# Patient Record
Sex: MALE | Race: WHITE | NOT HISPANIC OR LATINO | Employment: UNEMPLOYED | URBAN - METROPOLITAN AREA
[De-identification: names, ages, dates, MRNs, and addresses within clinical notes are randomized per-mention and may not be internally consistent; named-entity substitution may affect disease eponyms.]

---
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
## 2020-08-31 ENCOUNTER — OFFICE VISIT (OUTPATIENT)
Dept: ORTHOPEDIC SURGERY | Facility: CLINIC | Age: 20
End: 2020-08-31

## 2020-08-31 VITALS
DIASTOLIC BLOOD PRESSURE: 76 MMHG | SYSTOLIC BLOOD PRESSURE: 129 MMHG | BODY MASS INDEX: 20.32 KG/M2 | HEIGHT: 72 IN | WEIGHT: 150 LBS | HEART RATE: 76 BPM

## 2020-08-31 DIAGNOSIS — M75.21 BICEPS TENDINITIS, RIGHT: ICD-10-CM

## 2020-08-31 DIAGNOSIS — M25.511 ACUTE PAIN OF RIGHT SHOULDER: Primary | ICD-10-CM

## 2020-08-31 PROCEDURE — 99203 OFFICE O/P NEW LOW 30 MIN: CPT | Performed by: NURSE PRACTITIONER

## 2020-08-31 NOTE — PROGRESS NOTES
Subjective:     Patient ID: Abrahan Wynn is a 20 y.o. male.    Chief Complaint:  Right shoulder pain  History of Present Illness  Abrahan Wynn presents to clinic for evaluation of his right shoulder.  Approximately 5 days ago was lifting chicken tenders at work to approximately 30 degrees with heavy bag of chicken tenders began experiencing pain at the anterior lateral aspect of the shoulder.  Rates discomfort as 7 to an 8 out of a 10 mainly aching sharp in nature constant and irritating pain.  He reports previous clavicle fracture in 7 th grade, was not ever seen by can tell based on abnormality.  To this day notices guarding of the right shoulder.  Approximately 5 years ago began experiencing pain lateral and anterolateral aspect right shoulder, increased pain with reaching out to side, reaching back which is continued to this day.. Decreased range of motion with reaching up. Recently tingling/numbness distal phalanx digits 2-5 however new. Prior football injury about 5 years ago. Seen at  for shoulder in past and most recently, recent x-ray imaging about 5 days ago completed at . Not currently taking any medications however has taken diclofenac and ibuprofen in the past.  Initially began working with his gym  at onset of injury denies that he was seen by orthopedist initial injury.  Denies any previous corticosteroid injections, surgical intervention. Increased pain at night when sleeping while laying, denies pain reaching across body, pain does radiate into lateral aspect neck, right side. Unable to workout due to pain with curling and bench press therefore unable to complete arm exercises.  He is right-hand dominant does report significantly decreased strength right upper extremity compared to that of the left.  Denies other concerns present this time.    Social History     Occupational History   • Not on file   Tobacco Use   • Smoking status: Current Some Day Smoker     Types: Electronic Cigarette   •  Smokeless tobacco: Former User   Substance and Sexual Activity   • Alcohol use: No   • Drug use: Yes     Types: Marijuana   • Sexual activity: Defer      Past Medical History:   Diagnosis Date   • Fracture, finger     5th finger   • H/O multiple concussions    • Migraine      Past Surgical History:   Procedure Laterality Date   • TONSILLECTOMY         Family History   Problem Relation Age of Onset   • Cancer Paternal Grandmother    • No Known Problems Mother    • Drug abuse Father    • No Known Problems Sister    • Anxiety disorder Brother          Review of Systems   Constitutional: Negative for chills, diaphoresis, fever and unexpected weight change.   HENT: Negative for hearing loss, nosebleeds, sore throat and tinnitus.    Eyes: Negative for pain and visual disturbance.   Respiratory: Negative for cough, shortness of breath and wheezing.    Cardiovascular: Negative for chest pain and palpitations.   Gastrointestinal: Negative for abdominal pain, diarrhea, nausea and vomiting.   Endocrine: Negative for cold intolerance, heat intolerance and polydipsia.   Genitourinary: Negative for difficulty urinating, dysuria and hematuria.   Musculoskeletal: Positive for arthralgias and myalgias. Negative for joint swelling.        Shoulder pain     Skin: Negative for rash and wound.   Allergic/Immunologic: Negative for environmental allergies.   Neurological: Negative for dizziness, syncope and numbness.   Hematological: Does not bruise/bleed easily.   Psychiatric/Behavioral: Negative for dysphoric mood and sleep disturbance. The patient is not nervous/anxious.            Objective:  Physical Exam    Vital signs reviewed.   General: No acute distress.  Eyes: conjunctiva clear; pupils equally round and reactive  ENT: external ears and nose atraumatic; oropharynx clear  CV: no peripheral edema  Resp: normal respiratory effort  Skin: no rashes or wounds; normal turgor  Psych: mood and affect appropriate; recent and remote memory  "intact    Vitals:    08/31/20 1448   BP: 129/76   Pulse: 76   Weight: 68 kg (150 lb)   Height: 182.9 cm (72\")         08/31/20  1448   Weight: 68 kg (150 lb)     Body mass index is 20.34 kg/m².      Right Shoulder Exam     Tenderness   The patient is experiencing tenderness in the acromion and biceps tendon.    Range of Motion   External rotation: 50 (Passive)   Forward flexion: 170 (Passive)     Tests   Boyle test: positive  Cross arm: negative  Impingement: positive  Drop arm: negative    Other   Erythema: absent  Sensation: normal  Pulse: present    Comments:  Internal rotation strength 4+ out of 5  External rotation strength 4+ out of 5  Supraspinatus strength 4 out of 5 strength subscapularis strength 4 out of 5  Biceps 4 out of 5  Mildly positive empty can  negative Plano's  Mildly positive Speed's  negative bear hug exam                   Imaging:   Independently reviewed 3 view x-ray imaging previously completed outside facility right shoulder negative acute fracture dislocation or other acute osseous injury no x-ray imaging available for comparison    Assessment:        1. Acute pain of right shoulder    2. Biceps tendinitis, right           Plan:  1.  Discussed plan of care with patient.  We will proceed with MRI of the right shoulder given the length of time with the acute exacerbation.  Plan to see him back in clinic after completion of testing discussed results and further plan of care.  He verbalized understanding of all information agrees with plan of care.  Denies other concerns present this time.  Orders:  Orders Placed This Encounter   Procedures   • MRI Shoulder Right Without Contrast       Medications:  No orders of the defined types were placed in this encounter.      Followup:  No follow-ups on file.    Abrahan was seen today for pain.    Diagnoses and all orders for this visit:    Acute pain of right shoulder  -     MRI Shoulder Right Without Contrast; Future    Biceps tendinitis, right  -    "  MRI Shoulder Right Without Contrast; Future          I ordered and reviewed the LA today.     Dictated utilizing Dragon dictation

## 2020-09-09 ENCOUNTER — HOSPITAL ENCOUNTER (OUTPATIENT)
Dept: MRI IMAGING | Facility: HOSPITAL | Age: 20
Discharge: HOME | End: 2020-09-09
Admitting: NURSE PRACTITIONER

## 2020-09-09 DIAGNOSIS — M25.511 ACUTE PAIN OF RIGHT SHOULDER: ICD-10-CM

## 2020-09-09 DIAGNOSIS — M75.21 BICEPS TENDINITIS, RIGHT: ICD-10-CM

## 2020-09-09 PROCEDURE — 73221 MRI JOINT UPR EXTREM W/O DYE: CPT

## 2020-09-15 ENCOUNTER — OFFICE VISIT (OUTPATIENT)
Dept: ORTHOPEDIC SURGERY | Facility: CLINIC | Age: 20
End: 2020-09-15

## 2020-09-15 VITALS — WEIGHT: 150 LBS | HEIGHT: 72 IN | BODY MASS INDEX: 20.32 KG/M2

## 2020-09-15 DIAGNOSIS — M75.21 BICEPS TENDINITIS, RIGHT: Primary | ICD-10-CM

## 2020-09-15 DIAGNOSIS — S43.421D SPRAIN OF RIGHT ROTATOR CUFF CAPSULE, SUBSEQUENT ENCOUNTER: ICD-10-CM

## 2020-09-15 PROCEDURE — 99214 OFFICE O/P EST MOD 30 MIN: CPT | Performed by: NURSE PRACTITIONER

## 2020-09-15 RX ORDER — DICLOFENAC SODIUM 75 MG/1
75 TABLET, DELAYED RELEASE ORAL 2 TIMES DAILY
Qty: 60 TABLET | Refills: 0 | Status: SHIPPED | OUTPATIENT
Start: 2020-09-15 | End: 2021-08-03

## 2020-09-15 NOTE — PROGRESS NOTES
Subjective:     Patient ID: Abrahan Wynn is a 20 y.o. male.    Chief Complaint:  Follow-up right shoulder pain  History of Present Illness  Abrahan Wynn returns to clinic for follow-up right shoulder.  Has completed MRI presents to discuss results.  Initially presented to clinic on 8/31/2020 with a reported 5-day history of pain at the right shoulder after he was lifting chicken tenders at work.  He was lifting approximately 30 pound bag of heavy chicken tenders and when he began experiencing pain in the anterior lateral aspect of the shoulder.   Rates discomfort as 7 to an 8 out of a 10 mainly aching sharp in nature constant and irritating pain.  He reports previous clavicle fracture in 7 th grade, was not ever seen by can tell based on abnormality.  To this day notices guarding of the right shoulder.  Approximately 5 years ago began experiencing pain lateral and anterolateral aspect right shoulder, increased pain with reaching out to side, reaching back which is continued to this day.. Decreased range of motion with reaching up. Recently tingling/numbness distal phalanx digits 2-5 however new. Prior football injury about 5 years ago. Seen at  for shoulder in past and most recently, recent x-ray imaging about 5 days ago completed at . Not currently taking any medications however has taken diclofenac and ibuprofen in the past.  Initially began working with his gym  at onset of injury denies that he was seen by orthopedist initial injury.  Denies any previous corticosteroid injections, surgical intervention. Increased pain at night when sleeping while laying, denies pain reaching across body, pain does radiate into lateral aspect neck, right side. Unable to workout due to pain with curling and bench press therefore unable to complete arm exercises.  He is right-hand dominant does report significantly decreased strength right upper extremity compared to that of the left.  Denies other concerns present this  time.     Social History     Occupational History   • Not on file   Tobacco Use   • Smoking status: Current Some Day Smoker     Types: Electronic Cigarette   • Smokeless tobacco: Former User   Substance and Sexual Activity   • Alcohol use: No   • Drug use: Yes     Types: Marijuana   • Sexual activity: Defer      Past Medical History:   Diagnosis Date   • Fracture, finger     5th finger   • H/O multiple concussions    • Migraine      Past Surgical History:   Procedure Laterality Date   • TONSILLECTOMY         Family History   Problem Relation Age of Onset   • Cancer Paternal Grandmother    • No Known Problems Mother    • Drug abuse Father    • No Known Problems Sister    • Anxiety disorder Brother          Review of Systems   Constitutional: Negative for chills, diaphoresis, fever and unexpected weight change.   HENT: Negative for hearing loss, nosebleeds, sore throat and tinnitus.    Eyes: Negative for pain and visual disturbance.   Respiratory: Negative for cough, shortness of breath and wheezing.    Cardiovascular: Negative for chest pain and palpitations.   Gastrointestinal: Negative for abdominal pain, diarrhea, nausea and vomiting.   Endocrine: Negative for cold intolerance, heat intolerance and polydipsia.   Genitourinary: Negative for difficulty urinating, dysuria and hematuria.   Musculoskeletal: Positive for arthralgias. Negative for joint swelling and myalgias.   Skin: Negative for rash and wound.   Allergic/Immunologic: Negative for environmental allergies.   Neurological: Negative for dizziness, syncope and numbness.   Hematological: Does not bruise/bleed easily.   Psychiatric/Behavioral: Negative for dysphoric mood and sleep disturbance. The patient is not nervous/anxious.            Objective:  Physical Exam    General: No acute distress.  Eyes: conjunctiva clear; pupils equally round and reactive  ENT: external ears and nose atraumatic; oropharynx clear  CV: no peripheral edema  Resp: normal  "respiratory effort  Skin: no rashes or wounds; normal turgor  Psych: mood and affect appropriate; recent and remote memory intact    Vitals:    09/15/20 1311   Weight: 68 kg (150 lb)   Height: 182.9 cm (72\")         09/15/20  1311   Weight: 68 kg (150 lb)     Body mass index is 20.34 kg/m².      Ortho Exam      Right Shoulder Exam      Tenderness   The patient is experiencing tenderness in the acromion and biceps tendon.     Range of Motion   External rotation: 50 (Passive)   Forward flexion: 170 (Passive)      Tests   Boyle test: positive  Cross arm: negative  Impingement: positive  Drop arm: negative     Other   Erythema: absent  Sensation: normal  Pulse: present     Comments:  Internal rotation strength 4+ out of 5  External rotation strength 4+ out of 5  Supraspinatus strength 4 out of 5 strength subscapularis strength 4 out of 5  Biceps 4 out of 5  Mildly positive empty can  negative Thayer's  Mildly positive Speed's  negative bear hug exam    Imaging:  Xr Shoulder 2+ View Right    Result Date: 8/26/2020  Negative right shoulder. Signer Name: Graham Stewart MD  Signed: 8/26/2020 4:37 PM  Workstation Name: [id number]  [facility]    Mri Shoulder Right Without Contrast    Result Date: 9/10/2020  1. No evidence of rotator cuff tear. 2. Mild peritendinous muscle edema in the teres minor muscle which may reflect a mild sprain. 3. Region of signal abnormality in the subcortical bone of the lateral humeral head adjacent to the attachment of the rotator cuff which may reflect a small focus of marrow edema or developing subcortical cyst. 4. The glenoid labrum appears grossly intact. Signer Name: Judith Gee MD  Signed: 9/10/2020 11:26 AM  Workstation Name: [id number]  [facility]    Assessment:        1. Biceps tendinitis, right    2. Sprain of right rotator cuff capsule, subsequent encounter           Plan:  1.  Discussed plan of care with patient.  We will " proceed with diclofenac oral 1 tablet twice daily with food.  Also proceed with physical therapy for range of motion strengthening.  I do recommend follow-up in office in 4 weeks to reevaluate.  He verbalized understanding of information agrees with plan of care.  Denies other concerns present time.  Orders:  Orders Placed This Encounter   Procedures   • Ambulatory Referral to Physical Therapy       Medications:  New Medications Ordered This Visit   Medications   • diclofenac (VOLTAREN) 75 MG EC tablet     Sig: Take 1 tablet by mouth 2 (Two) Times a Day.     Dispense:  60 tablet     Refill:  0       Followup:  No follow-ups on file.    Abrahan was seen today for follow-up.    Diagnoses and all orders for this visit:    Biceps tendinitis, right  -     Ambulatory Referral to Physical Therapy    Sprain of right rotator cuff capsule, subsequent encounter  -     Ambulatory Referral to Physical Therapy    Other orders  -     diclofenac (VOLTAREN) 75 MG EC tablet; Take 1 tablet by mouth 2 (Two) Times a Day.        Dictated utilizing Dragon dictation

## 2020-09-16 PROBLEM — S43.421A SPRAIN OF RIGHT ROTATOR CUFF CAPSULE: Status: ACTIVE | Noted: 2020-09-16

## 2020-09-24 ENCOUNTER — HOSPITAL ENCOUNTER (OUTPATIENT)
Dept: PHYSICAL THERAPY | Facility: HOSPITAL | Age: 20
Setting detail: THERAPIES SERIES
Discharge: HOME | End: 2020-09-24

## 2020-09-24 DIAGNOSIS — S43.421A SPRAIN OF RIGHT ROTATOR CUFF CAPSULE, INITIAL ENCOUNTER: Primary | ICD-10-CM

## 2020-09-24 DIAGNOSIS — M75.21 BICEPS TENDINITIS ON RIGHT: ICD-10-CM

## 2020-09-24 PROCEDURE — 97161 PT EVAL LOW COMPLEX 20 MIN: CPT

## 2020-09-24 NOTE — THERAPY EVALUATION
Outpatient Physical Therapy Ortho Initial Evaluation  [place]     Patient Name: Abrahan Wynn  : 2000  MRN: [id number]  Today's Date: 2020      Visit Date: 2020    Patient Active Problem List   Diagnosis   • Sprain of right ankle   • Sports physical   • Sprain of right rotator cuff capsule        Past Medical History:   Diagnosis Date   • Fracture, finger     5th finger   • H/O multiple concussions    • Migraine         Past Surgical History:   Procedure Laterality Date   • TONSILLECTOMY         Visit Dx:     ICD-10-CM ICD-9-CM   1. Sprain of right rotator cuff capsule, initial encounter  S43.421A 840.4   2. Biceps tendinitis on right  M75.21 726.12         Patient History     Row Name 20 1500             History    Chief Complaint  Difficulty with daily activities;Joint stiffness;Muscle tenderness;Muscle weakness;Pain;Numbness;Tinglings;Tightness  -LN      Type of Pain  Shoulder pain Right  -LN      Date Current Problem(s) Began  -- 7th grade; increased 1 month ago  -LN      Brief Description of Current Complaint  Patient broke his clavicle in 7th grade playing football and he never went to doctor and he has had pain in right shoulder ever since.  Patient reports he was at work about a month ago and he was lifting a bag of frozen chicken tenders and he had a sharp pain in his shoulder and decided he needed to get his shoulder checked out- bag weighed 3#.  Increased pain since then. He reports a lot of popping in shoulder.  Reports N/T in fingertips with holding arm down extended time. occas. radiating pain into elbow (sharp).  MRI showed a RC sprain (teres minor).   -LN      Patient/Caregiver Goals  Relieve pain;Improve mobility;Improve strength;Know what to do to help the symptoms  -LN      Hand Dominance  right-handed  -LN      Occupation/sports/leisure activities  works at [employer] (); likes to play basketball; likes music  -LN      Patient seeing anyone else  "for problem(s)?  Ortho  -LN      How has patient tried to help current problem?  ice; anti-inflammatory  -LN      What clinical tests have you had for this problem?  X-ray;MRI  -LN      Results of Clinical Tests  No evidence of rotator cuff tear.    Mild peritendinous muscle edema in the teres minor muscle which may reflect a mild sprain. Region of signal abnormality in the subcortical bone of the lateral humeral head adjacent to the attachment of the rotator cuff which may reflect a small focus of marrow edema or developing subcortical cyst 4. The glenoid labrum appears grossly intact  -LN      Related/Recent Hospitalizations  No  -LN      Surgery/Hospitalization  n/a  -LN      History of Previous Related Injuries  broken clavicle in 7th grade- football injury.  -LN         Pain     Pain Location  Shoulder \"Inside center of shoulder\"; right  -LN      Pain at Present  6  -LN      Pain at Best  6  -LN      Pain at Worst  10  -LN      Pain Frequency  Constant/continuous  -LN      Pain Description  Tiring;Sharp irritation; occas sharp  -LN      What Performance Factors Make the Current Problem(s) WORSE?  going back with arm; overhead movement; lifting with right arm  -LN      What Performance Factors Make the Current Problem(s) BETTER?  rest  -LN      Tolerance Time- Lying  can't tolerated lying on right side secondary to pain  -LN      Is your sleep disturbed?  Yes  -LN      What position do you sleep in?  Supine;Left sidelying  -LN      Difficulties at work?  can do everything, but has made adjustments and uses left arm more  -LN      Difficulties with ADL's?  pain with all ADLs using right arm; pain with donning and doffing shirt.  -LN      Difficulties with recreational activities?  Basketball- can't play right now  -LN         Fall Risk Assessment    Any falls in the past year:  Yes  -LN      Number of falls reported in the last 12 months  1  -LN      Factors that contributed to the fall:  Tripped at work  -LN   " "      Services    Prior Rehab/Home Health Experiences  No  -LN      Are you currently receiving Home Health services  No  -LN      Do you plan to receive Home Health services in the near future  No  -LN         Daily Activities    Primary Language  English  -LN      How does patient learn best?  Listening;Demonstration  -LN      Teaching needs identified  Home Exercise Program;Other (comment) Risks and benefits of treatment explained to patient.  -LN      Patient is concerned about/has problems with  Bed Mobility;Difficulty with self care (i.e. bathing, dressing, toileting:;Flexibility;Grasping objects lifting;Performing home management (household chores, shopping, care of dependents);Performing job responsibilities/community activities (work, school,;Performing sports, recreation, and play activities;Reaching over head;Repetitive movements of the hand, arm, shoulder  -LN      Does patient have problems with the following?  Depression;Anxiety;Other (comment);Panic Attack emotional problems   -LN      Barriers to learning  None  -LN      Pt Participated in POC and Goals  Yes  -LN         Safety    Are you being hurt, hit, or frightened by anyone at home or in your life?  No  -LN      Are you being neglected by a caregiver  No  -LN        User Key  (r) = Recorded By, (t) = Taken By, (c) = Cosigned By    Initials Name Provider Type    Darshana Champion, PT Physical Therapist          PT Ortho     Row Name 09/24/20 1500       Subjective Comments    Subjective Comments  Patient reports his shoulder has given him problems for a long time but worse x 1 month.  \"I can't lift my arm very high.\" \"I didn't think it was weak but the doctor said it is a lot weaker than my left arm when she tested it.\"  Patient reports that when he lies on his back, his right shoulder won't go flat and if he pushes it down, it really hurts a lot.   -LN       Precautions and Contraindications    Precautions/Limitations  no known " precautions/limitations  -LN       Subjective Pain    Able to rate subjective pain?  yes  -LN    Pre-Treatment Pain Level  6  -LN       Posture/Observations    Forward Head  Moderate  -LN    Rounded Shoulders  Right:;Moderate;Left:;Mild  -LN    Posture/Observations Comments  Patient tends to keep right shoulder very guarded; Bilateral scapula winging noted; R>L (scapula dyskinesis)   -LN       Shoulder Impingement/Rotator Cuff Special Tests    Full Can Test (RC Lesion)  Right:;Positive  -LN    Empty Can Test (RC Lesion)  Right:;Positive  -LN    Drop Arm Test (Full Thickness RC Lesion)  Right:;Negative  -LN    Speed's Test (LH of Biceps Lesion)  Right:;Positive  -LN       Shoulder Girdle Palpation    Supraspinatus Insertion  Right:;Tender  -LN    AC Joint  Right:;Tender  -LN    Teres Minor  Right:;Tender;Guarded/taut  -LN    Pect Minor  Right:;Tender;Guarded/taut  -LN    Upper Trap  Right:;Guarded/taut  -LN       General ROM    GENERAL ROM COMMENTS  Right elbow flexion WFL; extension 8 degrees- he has limited extension B elbows.   -LN       Right Upper Ext    Rt Shoulder Abduction AROM  100 degrees  -LN    Rt Shoulder Abduction PROM  127 degrees  -LN    Rt Shoulder Flexion AROM  126 degrees  -LN    Rt Shoulder Flexion PROM  135 degrees  -LN    Rt Shoulder External Rotation AROM  56 degrees  -LN    Rt Shoulder External Rotation PROM  60 degrees  -LN    Rt Shoulder Internal Rotation AROM  behind back- L4 level; 80 degrees  -LN    Rt Shoulder Internal Rotation PROM  90 degrees  -LN       MMT (Manual Muscle Testing)    General MMT Comments  Left shoulder 5/5.  -LN       MMT Right Upper Ext    Rt Shoulder Flexion MMT, Gross Movement  (4-/5) good minus  -LN    Rt Shoulder Extension MMT, Gross Movement  (4+/5) good plus  -LN    Rt Shoulder ABduction MMT, Gross Movement  (3+/5) fair plus  -LN    Rt Shoulder ADduction MMT, Gross Movement  (4+/5) good plus  -LN    Rt Shoulder Internal Rotation MMT, Gross Movement  (4/5) good   -LN    Rt Shoulder External Rotation MMT, Gross Movement  (4/5) good  -LN    Rt Scapular ADduction MMT, Gross Movement  (3+/5) fair plus  -LN    Rt Elbow Flexion MMT, Gross Movement:  (4/5) good  -LN    Rt Elbow Extension MMT, Gross Movement:  (4/5) good  -LN    Rt Upper Extremity Comments   Decreased scapula stability noted and right scapula tends to wing significantly with shoulder ROM fahad when he brings his arm down from forward flexion.  -LN       Sensation    Sensation WNL?  WNL  -LN    Light Touch  No apparent deficits  -LN    Additional Comments  does c/o N/T in fingertips occas. fahad if he keeps his arm down too long.  -LN       Upper Extremity Flexibility    Upper Trapezius  Right:;Moderately limited  -LN    Pect Minor  Right:;Moderately/severely limited  -LN    Pect Major  Right:;Moderately/severely limited  -LN       Gait/Stairs (Locomotion)    Comment (Gait/Stairs)  Patient independent with all mobility and gait.  -LN      User Key  (r) = Recorded By, (t) = Taken By, (c) = Cosigned By    Initials Name Provider Type    LN Darshana Cuellar, PT Physical Therapist                      Therapy Education  Education Details: Patient to work on HEP 2 x day and use MH/CP PRN.  Posture education.  Given: HEP, Symptoms/condition management, Pain management, Posture/body mechanics  Program: New  How Provided: Verbal, Demonstration, Written  Level of Understanding: Teach back education performed, Verbalized, Demonstrated     PT OP Goals     Row Name 09/24/20 1800          PT Short Term Goals    STG Date to Achieve  10/08/20  -LN     STG 1  Patient to verbally report decreased right shoulder pain to <5/10 with ADLs and everyday activities.  -LN     STG 2  Patient to have improved right shoulder AROM to 140 degrees flexion, 120 degrees scaption/abduction, IR 90 degrees, and ER 70 degrees to allow for improved functional use of  right UE with ADLs.   -LN     STG 3  Patient to have improved right shoulder & elbow  strength by 1/2 muscle grade.  -LN     STG 4  Patient able to tolerate lying on right shoulder to sleep for short periods.   -LN        Long Term Goals    LTG Date to Achieve  10/22/20  -LN     LTG 1  Patient to verbally report decreased right shoulder pain to <3/10 with ADLs and everyday activities.  -LN     LTG 2  Patient independent with HEP issued by therapist.  -LN     LTG 3  Right shoulder AROM improved to WFL all planes to allow good functional use of right UE with ADLs.   -LN     LTG 4  Right shoulder & elbow strength improved to 4+/5-5/5.  -LN     LTG 5  Patient able to don and doff shirt with no c/o increased right shoulder pain.   -LN     LTG 6  Right scapula strength improved to 4+/5 with decreased winging noted with shoulder ROM/improved scapula stability.  -LN     LTG 7  Patient able to shoot 5-10 baskets with right shoulder pain no > than 4/10.   -LN        Time Calculation    PT Goal Re-Cert Due Date  10/22/20  -LN       User Key  (r) = Recorded By, (t) = Taken By, (c) = Cosigned By    Initials Name Provider Type    Darshana Champion, PT Physical Therapist          PT Assessment/Plan     Row Name 09/24/20 1500          PT Assessment    Functional Limitations  Limitation in home management;Limitations in community activities;Limitations in functional capacity and performance;Performance in leisure activities;Performance in self-care ADL;Performance in work activities;Performance in sport activities  -LN     Impairments  Impaired flexibility;Joint mobility;Muscle strength;Pain;Posture;Range of motion;Sensation  -LN     Assessment Comments  Patient presents with long history of right shoulder pain/issues since he fractured his right clavicle in 7th grade and recent increase in shoulder pain after lifting a bag of frozen chicken tenders 1 month ago at work.  Patient presents with constant right shoulder pain, fahad posterior in area of teres minor, decreased right shoulder ROM, decreased right  shoulder/RC and elbow strength, decreased right scapula strength and stability noted; significant right pec tightness/forward shoulder position; decreased functional use of right UE with ADLs and everyday work and home activities; decreased tolerance to leisure activities, including basketball. Patient with signs of right RC/teres minor strain with right scapula dyskinesis noted.   -LN     Please refer to paper survey for additional self-reported information  Yes  -LN     Rehab Potential  Good  -LN     Patient/caregiver participated in establishment of treatment plan and goals  Yes  -LN     Patient would benefit from skilled therapy intervention  Yes  -LN        PT Plan    PT Frequency  1x/week;2x/week  -LN     Predicted Duration of Therapy Intervention (OT)  6 weeks  -LN     Planned CPT's?  PT EVAL LOW COMPLEXITY: 69680;PT THER PROC EA 15 MIN: 83343;PT MANUAL THERAPY EA 15 MIN: 17783;PT HOT OR COLD PACK TREAT MCARE;PT ELECTRICAL STIM UNATTEND: ;PT ULTRASOUND EA 15 MIN: 65650;PT IONTOPHORESIS EA 15 MIN: 03065  -LN     Physical Therapy Interventions (Optional Details)  home exercise program;joint mobilization;manual therapy techniques;modalities;patient/family education;postural re-education;ROM (Range of Motion);strengthening;stretching;taping  -LN     PT Plan Comments  See patient 1-2 x week for therapeutic exercise/ROM/RC strengthening/scapula strengtheningand stabilization/pec stretching with HEP, modalities PRN (IFC/MH/CP/US/ionto.); Kinesiotape PRN. Patient and posture education.  Consider EMS for scapula strengthening/mm re-ed.   -LN       User Key  (r) = Recorded By, (t) = Taken By, (c) = Cosigned By    Initials Name Provider Type    Darshana Champion, PT Physical Therapist          Modalities     Row Name 09/24/20 1500             Ice    Ice Applied  Yes  -LN      Location  right shoulder with IFC  -LN      Rx Minutes  15 mins  -LN      Ice S/P Rx  Yes  -LN      Patient reports will apply ice  "at home to involved area  Yes  -LN         ELECTRICAL STIMULATION    Attended/Unattended  Unattended  -LN      Stimulation Type  IFC  -LN      Location/Electrode Placement/Other  Right shoulder with CP.  -LN       PT E-Stim Unattended (Manual) Minutes  15  -LN        User Key  (r) = Recorded By, (t) = Taken By, (c) = Cosigned By    Initials Name Provider Type    Darshana Champion, PT Physical Therapist        OP Exercises     Row Name 09/24/20 1500             Precautions    Existing Precautions/Restrictions  no known precautions/restrictions  -LN         Subjective Comments    Subjective Comments  Patient reports his shoulder has given him problems for a long time but worse x 1 month.  \"I can't lift my arm very high.\" \"I didn't think it was weak but the doctor said it is a lot weaker than my left arm when she tested it.\"  Patient reports that when he lies on his back, his right shoulder won't go flat and if he pushes it down, it really hurts a lot.   -LN         Subjective Pain    Able to rate subjective pain?  yes  -LN      Pre-Treatment Pain Level  6  -LN      Post-Treatment Pain Level  6  -LN         Exercise 1    Exercise Name 1  supine cane AA shoulder flexion  -LN      Cueing 1  Verbal;Tactile;Demo  -LN      Reps 1  10  -LN      Time 1  5 sec  -LN         Exercise 2    Exercise Name 2  supine cane AA shoulder ER  -LN      Cueing 2  Verbal;Tactile;Demo  -LN      Reps 2  10   -LN      Time 2  5 sec  -LN         Exercise 3    Exercise Name 3  codman's pendulum cw and ccw  -LN      Cueing 3  Verbal;Tactile;Demo  -LN      Reps 3  20 each  -LN         Exercise 4    Exercise Name 4  wall walk flexion  -LN      Cueing 4  Verbal;Tactile;Demo  -LN      Reps 4  5  -LN      Time 4  10 sec  -LN         Exercise 5    Exercise Name 5  scapula retraction  -LN      Cueing 5  Verbal;Tactile;Demo  -LN      Reps 5  5  -LN      Time 5  10 sec  -LN      Additional Comments  cueing for proper technique  -LN      "   User Key  (r) = Recorded By, (t) = Taken By, (c) = Cosigned By    Initials Name Provider Type    Darshana Champion, PT Physical Therapist           Manual Rx (last 36 hours)      Manual Treatments     Row Name 09/24/20 1500             Manual Rx 1    Manual Rx 1 Location  Right shoulder  -LN      Manual Rx 1 Type  PROM all planes x 5-8 reps each with patient supine.   -LN      Manual Rx 1 Grade  to tolerance  -LN        User Key  (r) = Recorded By, (t) = Taken By, (c) = Cosigned By    Initials Name Provider Type    Darshana Champion, PT Physical Therapist                      Outcome Measure Options: Quick DASH  Quick DASH  Open a tight or new jar.: Mild Difficulty  Do heavy household chores (e.g., wash walls, wash floors): Moderate Difficulty  Carry a shopping bag or briefcase: Moderate Difficulty  Wash your back: Severe Difficulty  Use a knife to cut food: No Difficulty  Recreational activities in which you take some force or impact through your arm, should or hand (e.g. golf, hammering, tennis, etc.): Severe Difficulty  During the past week, to what extent has your arm, shoulder, or hand problem interfered with your normal social activites with family, friends, neighbors or groups?: Quite a bit  During the past week, were you limited in your work or other regular daily activities as a result of your arm, shoulder or hand problem?: Moderately Limited  Arm, Shoulder, or hand pain: Moderate  Tingling (pins and needles) in your arm, shoulder, or hand: Moderate  During the past week, how much difficulty have you had sleeping because of the pain in your arm, shoulder or hand?: Severe Difficulty  Number of Questions Answered: 11  Quick DASH Score: 52.27  Work Module (Optional)  Using your usual technique for your work?: Unable( at [employer])  Doing your usual work because of arm, shoulder or hand pain?: Moderate Difficulty  Doing your work as well as you would like?: No  Difficulty  Spending your usual amount of time doing your work?: No Difficulty  Work Module Score: 37.5  Sports/Performing Arts Module (Optional)  Using your usual technique for playing your instrument or sport?: Unable(Basketball)  Playing your musical instrument or sport because of arm, shoulder or hand pain?: Unable  Playing your musical instrument or sport as well as you would like?: Unable  Spending your usual amount of time practising or playing your instrument or sport?: Unable  Sports/Performing Arts Score: 100         Time Calculation:     Start Time: 1505  Stop Time: 1610  Time Calculation (min): 65 min     Therapy Charges for Today     Code Description Service Date Service Provider Modifiers Qty    08087461196 HC PT EVAL LOW COMPLEXITY 4 9/24/2020 Darshana Cuellar, PT GP 1          PT G-Codes  Outcome Measure Options: Quick DASH  Quick DASH Score: 52.27         Darshana Cuellar, PT  9/24/2020

## 2020-09-29 ENCOUNTER — DOCUMENTATION (OUTPATIENT)
Dept: PHYSICAL THERAPY | Facility: HOSPITAL | Age: 20
End: 2020-09-29

## 2020-09-29 ENCOUNTER — APPOINTMENT (OUTPATIENT)
Dept: PHYSICAL THERAPY | Facility: HOSPITAL | Age: 20
End: 2020-09-29

## 2020-10-07 ENCOUNTER — HOSPITAL ENCOUNTER (OUTPATIENT)
Dept: PHYSICAL THERAPY | Facility: HOSPITAL | Age: 20
Setting detail: THERAPIES SERIES
Discharge: HOME | End: 2020-10-07

## 2020-10-07 DIAGNOSIS — S43.421A SPRAIN OF RIGHT ROTATOR CUFF CAPSULE, INITIAL ENCOUNTER: Primary | ICD-10-CM

## 2020-10-07 PROCEDURE — 97110 THERAPEUTIC EXERCISES: CPT

## 2020-10-07 NOTE — THERAPY TREATMENT NOTE
Outpatient Physical Therapy Ortho Treatment Note  [facility]     Patient Name: Abrahan Wynn  : 2000  MRN: [id number]  Today's Date: 10/7/2020      Visit Date: 10/07/2020    Visit Dx:    ICD-10-CM ICD-9-CM   1. Sprain of right rotator cuff capsule, initial encounter  S43.421A 840.4       Patient Active Problem List   Diagnosis   • Sprain of right ankle   • Sports physical   • Sprain of right rotator cuff capsule        Past Medical History:   Diagnosis Date   • Fracture, finger     5th finger   • H/O multiple concussions    • Migraine         Past Surgical History:   Procedure Laterality Date   • TONSILLECTOMY                         PT Assessment/Plan     Row Name 10/07/20 1428          PT Assessment    Assessment Comments  Pt tolerated progression of scapular strengthening well; reports of soreness at completion of session. Weakness noted primarily with prone exercises. Trial of KT tape applied for scapular stabilization.   -KM        PT Plan    PT Plan Comments  Plan to see pt 1x next week. Pt to continue with HEP.   -KM       User Key  (r) = Recorded By, (t) = Taken By, (c) = Cosigned By    Initials Name Provider Type    Martha Lemus PTA Physical Therapy Assistant          Modalities     Row Name 10/07/20 1428             Ice    Ice Applied  --  -KM      Location  --  -KM      Rx Minutes  --  -KM      Ice S/P Rx  --  -KM      Patient denies application of Ice  Yes  -KM      Patient reports will apply ice at home to involved area  Yes  -KM         ELECTRICAL STIMULATION    Attended/Unattended  --  -KM      Stimulation Type  --  -KM      Location/Electrode Placement/Other  --  -KM         Functional Testing    Outcome Measure Options  Quick DASH  -KM         Other Treatment Provided    Taping / Bracing  KT Tape- Two strips inferior/superior to scapular with retraction.   -KM        User Key  (r) = Recorded By, (t) = Taken By, (c) = Cosigned By    Initials Name Provider Type    Martha Lemus  IL[facility] Physical Therapy Assistant        OP Exercises     Row Name 10/07/20 1428             Precautions    Existing Precautions/Restrictions  no known precautions/restrictions  -KM         Subjective Comments    Subjective Comments  Pt states he has been compliant with the HEP and experience increased soreness primarily with scapular retraction.   -KM         Exercise 1    Exercise Name 1  supine cane AA shoulder flexion  -KM      Cueing 1  Verbal;Tactile;Demo  -KM      Reps 1  15  -KM      Time 1  5 sec  -KM         Exercise 2    Exercise Name 2  supine cane AA shoulder ER  -KM      Cueing 2  Verbal;Tactile;Demo  -KM      Reps 2  15  -KM      Time 2  5 sec  -KM         Exercise 3    Exercise Name 3  codman's pendulum cw and ccw  -KM      Cueing 3  Verbal;Tactile;Demo  -KM      Reps 3  --  -KM         Exercise 4    Exercise Name 4  wall walk flexion  -KM      Cueing 4  Verbal;Tactile;Demo  -KM      Reps 4  5  -KM      Time 4  10 sec  -KM         Exercise 5    Exercise Name 5  scapula retraction  -KM      Cueing 5  Verbal;Tactile;Demo  -KM      Reps 5  5  -KM      Time 5  10 sec  -KM         Exercise 6    Exercise Name 6  Pec Stretch on Half Foam Roll  -KM      Time 6  3 min   -KM         Exercise 7    Exercise Name 7  Prone Scapular Row  -KM      Reps 7  20  -KM         Exercise 8    Exercise Name 8  Prone T's  -KM      Reps 8  20  -KM         Exercise 9    Exercise Name 9  TB: Rows & Ext  -KM      Reps 9  20  -KM      Time 9  Blue  -KM        User Key  (r) = Recorded By, (t) = Taken By, (c) = Cosigned By    Initials Name Provider Type     Martha Christianson PTA Physical Therapy Assistant                      Manual Rx (last 36 hours)      Manual Treatments     Row Name 10/07/20 1428             Manual Rx 1    Manual Rx 1 Location  Right shoulder  -KM      Manual Rx 1 Type  PROM all planes x 5-8 reps each with patient supine.   -KM      Manual Rx 1 Grade  to tolerance  -KM        User Key  (r) = Recorded By, (t) =  Taken By, (c) = Cosigned By    Initials Name Provider Type    Martha Lemus PTA Physical Therapy Assistant                   Outcome Measure Options: Quick DASH         Time Calculation:   Start Time: 1428  Stop Time: 1510  Time Calculation (min): 42 min  Therapy Charges for Today     Code Description Service Date Service Provider Modifiers Qty    28148776379 HC PT THER PROC EA 15 MIN 10/7/2020 Martha Christianson PTA GP 2          PT G-Codes  Outcome Measure Options: Quick DASH         Martha Christianson PTA  10/7/2020

## 2020-10-15 ENCOUNTER — HOSPITAL ENCOUNTER (OUTPATIENT)
Dept: PHYSICAL THERAPY | Facility: HOSPITAL | Age: 20
Setting detail: THERAPIES SERIES
Discharge: HOME | End: 2020-10-15

## 2020-10-15 DIAGNOSIS — S43.421A SPRAIN OF RIGHT ROTATOR CUFF CAPSULE, INITIAL ENCOUNTER: Primary | ICD-10-CM

## 2020-10-15 DIAGNOSIS — M75.21 BICEPS TENDINITIS ON RIGHT: ICD-10-CM

## 2020-10-15 PROCEDURE — 97140 MANUAL THERAPY 1/> REGIONS: CPT

## 2020-10-15 PROCEDURE — 97110 THERAPEUTIC EXERCISES: CPT

## 2020-10-15 NOTE — THERAPY TREATMENT NOTE
"    Outpatient Physical Therapy Ortho Treatment Note  [facility]     Patient Name: Abrahan Wynn  : 2000  MRN: [id number]  Today's Date: 10/15/2020      Visit Date: 10/15/2020    Visit Dx:    ICD-10-CM ICD-9-CM   1. Sprain of right rotator cuff capsule, initial encounter  S43.421A 840.4   2. Biceps tendinitis on right  M75.21 726.12       Patient Active Problem List   Diagnosis   • Sprain of right ankle   • Sports physical   • Sprain of right rotator cuff capsule        Past Medical History:   Diagnosis Date   • Fracture, finger     5th finger   • H/O multiple concussions    • Migraine         Past Surgical History:   Procedure Laterality Date   • TONSILLECTOMY         PT Ortho     Row Name 10/15/20 1400       Subjective Comments    Subjective Comments  Patient reports he got a lot of new exercises last visit and they made his shoulder pretty sore. Patient reports that he doesn't like any of the exercises he has to bring shoulders back.  Patient reports that he thinks the tape did help, \"but I kept messing with it and for some reason my shoulder was hurting more the next day so I took it off but I want to try it again.\"   -LN       Precautions and Contraindications    Precautions/Limitations  no known precautions/limitations  -LN       Subjective Pain    Able to rate subjective pain?  yes  -LN    Pre-Treatment Pain Level  1 -2/10  -LN      User Key  (r) = Recorded By, (t) = Taken By, (c) = Cosigned By    Initials Name Provider Type    Darshana Champion, PT Physical Therapist                      PT Assessment/Plan     Row Name 10/15/20 1700          PT Assessment    Assessment Comments  Patient tolerated treatment fairly well but very weak with any exercise for scapula retraction/prone scapula exercises and reports pain with these- \"I don't like those.\" He tolerated PROM well and improved PROM noted. Needs continued scapula strengthening/stabilization.   -LN        PT Plan    PT Plan Comments  " "Continue per POC 1 x week; next appointment is not until 10/30 secondary to patient out of town for work.  Progress with exercises/pec stretching as tolerated with HEP;CP PRN. Kinesiotape PRN. Consider trial of EMS for scapula strengthening.   -LN       User Key  (r) = Recorded By, (t) = Taken By, (c) = Cosigned By    Initials Name Provider Type    Darshana Champion, PT Physical Therapist          Modalities     Row Name 10/15/20 1400             Moist Heat    Patient denies application of MH  Yes  -LN         Ice    Ice Applied  Yes  -LN      Location  right shoulder with patient seated  -LN      Rx Minutes  10 mins  -LN      Ice S/P Rx  Yes  -LN         Other Treatment Provided    Taping / Bracing  KT Tape- Two strips inferior to superior to scapular with retraction.  Patient instructed in use and care of tape, including safe removal of tape. Patient to leave tape on up to 5 days and to trim/remove tape as needed.   -LN        User Key  (r) = Recorded By, (t) = Taken By, (c) = Cosigned By    Initials Name Provider Type    Darshana Champion, PT Physical Therapist        OP Exercises     Row Name 10/15/20 1400             Precautions    Existing Precautions/Restrictions  no known precautions/restrictions  -LN         Subjective Comments    Subjective Comments  Patient reports he got a lot of new exercises last visit and they made his shoulder pretty sore. Patient reports that he doesn't like any of the exercises he has to bring shoulders back.  Patient reports that he thinks the tape did help, \"but I kept messing with it and for some reason my shoulder was hurting more the next day so I took it off but I want to try it again.\"   -LN         Subjective Pain    Able to rate subjective pain?  yes  -LN      Pre-Treatment Pain Level  1 1-2/10  -LN         Exercise 1    Exercise Name 1  supine cane AA shoulder flexion  -LN      Cueing 1  Verbal;Tactile;Demo  -LN      Reps 1  15  -LN      Time 1  5 sec  " -LN         Exercise 2    Exercise Name 2  supine cane AA shoulder ER  -LN      Cueing 2  Verbal;Tactile;Demo  -LN      Reps 2  15  -LN      Time 2  5 sec  -LN         Exercise 3    Exercise Name 3  codman's pendulum cw and ccw  -LN      Cueing 3  Verbal;Tactile;Demo  -LN      Reps 3  20 each  -LN      Additional Comments  HEP  -LN         Exercise 4    Exercise Name 4  wall walk flexion  -LN      Cueing 4  Verbal;Tactile;Demo  -LN      Reps 4  10  -LN      Time 4  10 sec  -LN         Exercise 5    Exercise Name 5  scapula retraction  -LN      Cueing 5  Verbal;Tactile;Demo  -LN      Reps 5  5  -LN      Time 5  10 sec  -LN      Additional Comments  painful for patient  -LN         Exercise 6    Exercise Name 6  Pec Stretch on Half Foam Roll  -LN      Time 6  4 min   -LN         Exercise 7    Exercise Name 7  Prone Scapular Row  -LN      Cueing 7  Verbal;Tactile;Demo  -LN      Reps 7  15  -LN         Exercise 8    Exercise Name 8  Prone T's  -LN      Cueing 8  Verbal;Tactile;Demo  -LN      Reps 8  10  -LN         Exercise 9    Exercise Name 9  Bilateral shoulder extension vs. TB  -LN      Cueing 9  Verbal;Tactile;Demo  -LN      Reps 9  20  -LN      Time 9  Blue  -LN         Exercise 10    Exercise Name 10  Rows vs. TB  -LN      Reps 10  10  -LN      Time 10  Blue TB  -LN         Exercise 11    Exercise Name 11  Corner stretch  -LN      Cueing 11  Verbal;Tactile;Demo  -LN      Reps 11  2  -LN      Time 11  10 sec  -LN      Additional Comments  to do at home instead of scapular retraction  -LN        User Key  (r) = Recorded By, (t) = Taken By, (c) = Cosigned By    Initials Name Provider Type    Darshana Champion PT Physical Therapist                      Manual Rx (last 36 hours)      Manual Treatments     Row Name 10/15/20 1300             Manual Rx 1    Manual Rx 1 Location  Right shoulder  -LN      Manual Rx 1 Type  PROM all planes x 8-10 reps each with patient supine.   -LN      Manual Rx 1 Grade  to  tolerance  -LN        User Key  (r) = Recorded By, (t) = Taken By, (c) = Cosigned By    Initials Name Provider Type    Darshana Champion, PT Physical Therapist              Therapy Education  Education Details: Patient to work on HEP 2 x day at home and use CP PRN and after exercises.  Posture education.  Given: HEP, Symptoms/condition management, Pain management, Posture/body mechanics  Program: New, Reinforced, Modified  How Provided: Verbal, Demonstration, Written(added corner stretch- to take the place of seated scap retraction at this time.)  Provided to: Patient  Level of Understanding: Teach back education performed, Verbalized, Demonstrated              Time Calculation:   Start Time: 1430  Stop Time: 1530  Time Calculation (min): 60 min  Therapy Charges for Today     Code Description Service Date Service Provider Modifiers Qty    [id number]  PT MANUAL THERAPY EA 15 MIN 10/15/2020 Darshana Cuellar, PT GP 1    97168084595 HC PT THER PROC EA 15 MIN 10/15/2020 Darshana Cuellar, PT GP 2                    Darshana Cuellar, PT  10/15/2020

## 2020-10-30 ENCOUNTER — APPOINTMENT (OUTPATIENT)
Dept: PHYSICAL THERAPY | Facility: HOSPITAL | Age: 20
End: 2020-10-30

## 2020-10-30 ENCOUNTER — DOCUMENTATION (OUTPATIENT)
Dept: PHYSICAL THERAPY | Facility: HOSPITAL | Age: 20
End: 2020-10-30